# Patient Record
Sex: FEMALE | Race: WHITE
[De-identification: names, ages, dates, MRNs, and addresses within clinical notes are randomized per-mention and may not be internally consistent; named-entity substitution may affect disease eponyms.]

---

## 2017-05-12 ENCOUNTER — HOSPITAL ENCOUNTER (EMERGENCY)
Dept: HOSPITAL 79 - ER1 | Age: 67
Discharge: HOME | End: 2017-05-12
Payer: MEDICARE

## 2017-05-12 DIAGNOSIS — Z79.899: ICD-10-CM

## 2017-05-12 DIAGNOSIS — M06.9: ICD-10-CM

## 2017-05-12 DIAGNOSIS — M19.022: Primary | ICD-10-CM

## 2017-05-12 DIAGNOSIS — Z88.0: ICD-10-CM

## 2017-05-12 DIAGNOSIS — Z87.891: ICD-10-CM

## 2017-06-05 ENCOUNTER — HOSPITAL ENCOUNTER (EMERGENCY)
Dept: HOSPITAL 79 - ER1 | Age: 67
Discharge: HOME | End: 2017-06-05
Payer: MEDICARE

## 2017-06-05 DIAGNOSIS — Z79.82: ICD-10-CM

## 2017-06-05 DIAGNOSIS — M71.22: Primary | ICD-10-CM

## 2017-06-05 DIAGNOSIS — M71.21: ICD-10-CM

## 2017-06-05 DIAGNOSIS — Z79.899: ICD-10-CM

## 2017-06-05 LAB
BUN/CREATININE RATIO: 16 (ref 0–10)
HGB BLD-MCNC: 14.2 GM/DL (ref 12.3–15.3)
RED BLOOD COUNT: 4.79 M/UL (ref 4–5.1)
WHITE BLOOD COUNT: 10.1 K/UL (ref 4.5–11)

## 2018-10-25 ENCOUNTER — TELEPHONE (OUTPATIENT)
Dept: ORTHOPEDIC SURGERY | Facility: CLINIC | Age: 68
End: 2018-10-25

## 2018-10-25 NOTE — TELEPHONE ENCOUNTER
Isaias,    Ask Radha or Yolanda if this patient can be worked in on Wednesday 10-31 or Monday 11-5.    Jarod

## 2018-10-25 NOTE — TELEPHONE ENCOUNTER
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 They states that Dr Saez did her knee replacement years ago and is wanting to see when she can come in ASAP to be seen. The physician does not think her hip will hold by the end of November which would be the next available that we would have for a New Patient per [employer] policy if it has been over three years. They are requesting to see if Dr Saez would be able to work in sooner based on the severity Dr Savage stating is an urgent matter.

## 2018-10-30 DIAGNOSIS — M25.551 ARTHRALGIA OF RIGHT HIP: Primary | ICD-10-CM

## 2018-10-31 ENCOUNTER — HOSPITAL ENCOUNTER (OUTPATIENT)
Dept: CT IMAGING | Facility: HOSPITAL | Age: 68
Discharge: HOME | End: 2018-10-31

## 2018-10-31 ENCOUNTER — LAB (OUTPATIENT)
Dept: LAB | Facility: HOSPITAL | Age: 68
End: 2018-10-31
Attending: ORTHOPAEDIC SURGERY

## 2018-10-31 ENCOUNTER — HOSPITAL ENCOUNTER (OUTPATIENT)
Dept: CT IMAGING | Facility: HOSPITAL | Age: 68
Discharge: HOME | End: 2018-10-31
Attending: ORTHOPAEDIC SURGERY | Admitting: ORTHOPAEDIC SURGERY

## 2018-10-31 ENCOUNTER — OFFICE VISIT (OUTPATIENT)
Dept: ORTHOPEDIC SURGERY | Facility: CLINIC | Age: 68
End: 2018-10-31

## 2018-10-31 ENCOUNTER — PREP FOR SURGERY (OUTPATIENT)
Dept: OTHER | Facility: HOSPITAL | Age: 68
End: 2018-10-31

## 2018-10-31 VITALS — WEIGHT: 146 LBS | BODY MASS INDEX: 27.56 KG/M2 | HEIGHT: 61 IN | RESPIRATION RATE: 18 BRPM

## 2018-10-31 DIAGNOSIS — M87.9 OSTEONECROSIS OF RIGHT HIP (HCC): Primary | ICD-10-CM

## 2018-10-31 DIAGNOSIS — R93.89 ABNORMAL CHEST X-RAY: ICD-10-CM

## 2018-10-31 DIAGNOSIS — M25.551 ARTHRALGIA OF RIGHT HIP: ICD-10-CM

## 2018-10-31 LAB
BUN BLD-MCNC: 15 MG/DL (ref 7–20)
CREAT BLD-MCNC: 0.5 MG/DL (ref 0.6–1.3)
GFR SERPL CREATININE-BSD FRML MDRD: 123 ML/MIN/1.73

## 2018-10-31 PROCEDURE — 25010000002 IOPAMIDOL 61 % SOLUTION: Performed by: ORTHOPAEDIC SURGERY

## 2018-10-31 PROCEDURE — 84520 ASSAY OF UREA NITROGEN: CPT

## 2018-10-31 PROCEDURE — 36415 COLL VENOUS BLD VENIPUNCTURE: CPT

## 2018-10-31 PROCEDURE — 82565 ASSAY OF CREATININE: CPT

## 2018-10-31 PROCEDURE — 99214 OFFICE O/P EST MOD 30 MIN: CPT | Performed by: ORTHOPAEDIC SURGERY

## 2018-10-31 PROCEDURE — 71260 CT THORAX DX C+: CPT

## 2018-10-31 RX ORDER — TRAMADOL HYDROCHLORIDE 50 MG/1
50 TABLET ORAL 2 TIMES DAILY PRN
Qty: 30 TABLET | Refills: 0 | Status: CANCELLED | OUTPATIENT
Start: 2018-10-31

## 2018-10-31 RX ORDER — ACETAMINOPHEN 500 MG
500 TABLET ORAL 2 TIMES DAILY
COMMUNITY

## 2018-10-31 RX ORDER — NAPROXEN 500 MG/1
500 TABLET ORAL 2 TIMES DAILY WITH MEALS
COMMUNITY

## 2018-10-31 RX ORDER — CLINDAMYCIN PHOSPHATE 900 MG/50ML
900 INJECTION, SOLUTION INTRAVENOUS
Status: CANCELLED | OUTPATIENT
Start: 2018-12-04 | End: 2018-12-05

## 2018-10-31 RX ADMIN — IOPAMIDOL 100 ML: 612 INJECTION, SOLUTION INTRAVENOUS at 13:56

## 2018-11-01 ENCOUNTER — TELEPHONE (OUTPATIENT)
Dept: ORTHOPEDIC SURGERY | Facility: CLINIC | Age: 68
End: 2018-11-01

## 2018-11-01 RX ORDER — TRAMADOL HYDROCHLORIDE 50 MG/1
50 TABLET ORAL 2 TIMES DAILY PRN
Qty: 30 TABLET | Refills: 0 | Status: SHIPPED | OUTPATIENT
Start: 2018-11-01

## 2018-11-01 NOTE — TELEPHONE ENCOUNTER
Franklyn Shipman PA-C reviewed results of patients chest CT from 10/31/18 ordered by Dr. Saez. STAT referral made to Dr. Devine. PCP Notified by Jagdeep Spangler and faxed results. Dr. Gunn office will contact pt for appt.

## 2018-11-01 NOTE — PROGRESS NOTES
Allegra and Jagdeep,     Tramadol Rx signed.    Patient referred to Dr. Devine appreciated, please follow up today and find out the date of appointment with Pulmonary and let me know.  Also we can see her back at our clinic once we know what will be involved with her Pulmonary care plan. Thanks.    Jarod

## 2018-11-05 ENCOUNTER — APPOINTMENT (OUTPATIENT)
Dept: PREADMISSION TESTING | Facility: HOSPITAL | Age: 68
End: 2018-11-05

## 2018-11-05 ENCOUNTER — OFFICE VISIT (OUTPATIENT)
Dept: PULMONOLOGY | Facility: CLINIC | Age: 68
End: 2018-11-05

## 2018-11-05 ENCOUNTER — PREP FOR SURGERY (OUTPATIENT)
Dept: OTHER | Facility: HOSPITAL | Age: 68
End: 2018-11-05

## 2018-11-05 VITALS
BODY MASS INDEX: 27.56 KG/M2 | DIASTOLIC BLOOD PRESSURE: 68 MMHG | OXYGEN SATURATION: 92 % | HEART RATE: 108 BPM | SYSTOLIC BLOOD PRESSURE: 110 MMHG | WEIGHT: 146 LBS | HEIGHT: 61 IN | RESPIRATION RATE: 18 BRPM

## 2018-11-05 VITALS
HEART RATE: 107 BPM | SYSTOLIC BLOOD PRESSURE: 177 MMHG | BODY MASS INDEX: 27.56 KG/M2 | WEIGHT: 146 LBS | OXYGEN SATURATION: 95 % | HEIGHT: 61 IN | DIASTOLIC BLOOD PRESSURE: 79 MMHG

## 2018-11-05 DIAGNOSIS — R59.0 MEDIASTINAL LYMPHADENOPATHY: ICD-10-CM

## 2018-11-05 DIAGNOSIS — Z87.891 PERSONAL HISTORY OF TOBACCO USE, PRESENTING HAZARDS TO HEALTH: ICD-10-CM

## 2018-11-05 DIAGNOSIS — R91.1 NODULE OF UPPER LOBE OF LEFT LUNG: ICD-10-CM

## 2018-11-05 DIAGNOSIS — J30.89 OTHER ALLERGIC RHINITIS: ICD-10-CM

## 2018-11-05 DIAGNOSIS — R59.0 MEDIASTINAL LYMPHADENOPATHY: Primary | ICD-10-CM

## 2018-11-05 DIAGNOSIS — J43.9 PULMONARY EMPHYSEMA, UNSPECIFIED EMPHYSEMA TYPE (HCC): ICD-10-CM

## 2018-11-05 DIAGNOSIS — R91.8 ABNORMAL CT SCAN, LUNG: Primary | ICD-10-CM

## 2018-11-05 DIAGNOSIS — J41.0 CHRONIC BRONCHITIS, SIMPLE (HCC): ICD-10-CM

## 2018-11-05 LAB
BASOPHILS # BLD AUTO: 0.03 10*3/MM3 (ref 0–0.2)
BASOPHILS NFR BLD AUTO: 0.4 % (ref 0–2.5)
DEPRECATED RDW RBC AUTO: 47.4 FL (ref 37–54)
EOSINOPHIL # BLD AUTO: 0.16 10*3/MM3 (ref 0–0.7)
EOSINOPHIL NFR BLD AUTO: 2.2 % (ref 0–7)
ERYTHROCYTE [DISTWIDTH] IN BLOOD BY AUTOMATED COUNT: 15.4 % (ref 11.5–14.5)
HCT VFR BLD AUTO: 38 % (ref 37–47)
HGB BLD-MCNC: 11.8 G/DL (ref 12–16)
IMM GRANULOCYTES # BLD: 0.02 10*3/MM3 (ref 0–0.06)
IMM GRANULOCYTES NFR BLD: 0.3 % (ref 0–0.6)
LYMPHOCYTES # BLD AUTO: 1.33 10*3/MM3 (ref 0.6–3.4)
LYMPHOCYTES NFR BLD AUTO: 18.6 % (ref 10–50)
MCH RBC QN AUTO: 26.5 PG (ref 27–31)
MCHC RBC AUTO-ENTMCNC: 31.1 G/DL (ref 30–37)
MCV RBC AUTO: 85.2 FL (ref 81–99)
MONOCYTES # BLD AUTO: 0.48 10*3/MM3 (ref 0–0.9)
MONOCYTES NFR BLD AUTO: 6.7 % (ref 0–12)
NEUTROPHILS # BLD AUTO: 5.14 10*3/MM3 (ref 2–6.9)
NEUTROPHILS NFR BLD AUTO: 71.8 % (ref 37–80)
NRBC BLD MANUAL-RTO: 0 /100 WBC (ref 0–0)
PLATELET # BLD AUTO: 474 10*3/MM3 (ref 130–400)
PMV BLD AUTO: 10 FL (ref 6–12)
RBC # BLD AUTO: 4.46 10*6/MM3 (ref 4.2–5.4)
WBC NRBC COR # BLD: 7.16 10*3/MM3 (ref 4.8–10.8)

## 2018-11-05 PROCEDURE — 93005 ELECTROCARDIOGRAM TRACING: CPT | Performed by: INTERNAL MEDICINE

## 2018-11-05 PROCEDURE — 85025 COMPLETE CBC W/AUTO DIFF WBC: CPT | Performed by: INTERNAL MEDICINE

## 2018-11-05 PROCEDURE — 99204 OFFICE O/P NEW MOD 45 MIN: CPT | Performed by: INTERNAL MEDICINE

## 2018-11-05 PROCEDURE — 36415 COLL VENOUS BLD VENIPUNCTURE: CPT

## 2018-11-05 RX ORDER — SODIUM CHLORIDE 9 MG/ML
42 INJECTION, SOLUTION INTRAVENOUS CONTINUOUS
Status: CANCELLED | OUTPATIENT
Start: 2018-11-05

## 2018-11-05 RX ORDER — FLUNISOLIDE 0.25 MG/ML
1 SOLUTION NASAL EVERY 12 HOURS
Qty: 1 BOTTLE | Refills: 5 | Status: SHIPPED | OUTPATIENT
Start: 2018-11-05

## 2018-11-05 RX ORDER — BUDESONIDE AND FORMOTEROL FUMARATE DIHYDRATE 160; 4.5 UG/1; UG/1
2 AEROSOL RESPIRATORY (INHALATION)
Qty: 1 INHALER | Refills: 5 | Status: SHIPPED | OUTPATIENT
Start: 2018-11-05

## 2018-11-05 NOTE — DISCHARGE INSTRUCTIONS

## 2018-11-05 NOTE — PROGRESS NOTES
Chief Complaint   Patient presents with   • Follow-up     Abnormal CT          Subjective   Annie Harrell is a 68 y.o. female.     History of Present Illness   Patient comes in today for consultation because of abnormal CT of the chest.    The patient apparently went to her rheumatologist and complained of some cough.  Since the dermatologist was considering starting the patient on biologic agents for rheumatoid arthritis, he ordered a chest x-ray which was reportedly abnormal.  Around the same time she was evaluated by Dr. Saez for possible hip surgery and he ordered a CT of the chest which was reported as abnormal involving left upper lobe lung nodule and lymphadenopathy.    The patient states that she used to smoke one to 2 packs per day for about 15 years having quit 20 years ago.  She also has a positive family history of lung cancer in her sister.    The patient apparently has lost at least 40 pounds over the past 2-1/2 years.     The patient also complains of cough and phlegm production the morning.  She does not complaining of shortness of breath but her x-rays capacity is extremely limited.    Patient also complains of runny nose and dribbling in the back of the throat for the past few weeks. This has been sometimes associated with seasonal variation.     The following portions of the patient's history were reviewed and updated as appropriate: allergies, current medications, past family history, past medical history, past social history and past surgical history.    Review of Systems   Constitutional: Negative for chills and fever.   HENT: Negative for sinus pressure, sneezing and sore throat.    Respiratory: Positive for cough. Negative for chest tightness and shortness of breath.    Cardiovascular: Negative for chest pain.   Psychiatric/Behavioral: Negative for sleep disturbance.   All other systems reviewed and are negative.      Objective   Visit Vitals  /68   Pulse 108   Resp 18   Ht 154.9 cm  "(60.98\")   Wt 66.2 kg (146 lb)   SpO2 92%   BMI 27.60 kg/m²       Physical Exam   Constitutional: She is oriented to person, place, and time. She appears well-developed.   HENT:   Nostril showed mild erythema.  Oropharynx was cobblestoned   Dentures   Eyes: EOM are normal.   Neck: Neck supple. No JVD present.   Cardiovascular: Normal rate and regular rhythm.    Pulmonary/Chest: Effort normal. She has no rales.   Somewhat hyperresonant to percussion.  Decreased Air Entry Bilaterally.   Musculoskeletal:   Used a walker.   Changes of RA noted.    Neurological: She is alert and oriented to person, place, and time.   Skin: Skin is warm and dry.   Psychiatric: She has a normal mood and affect. Her behavior is normal.   Vitals reviewed.        Assessment/Plan   Annie was seen today for follow-up.    Diagnoses and all orders for this visit:    Abnormal CT scan, lung    Chronic bronchitis, simple (CMS/HCC)    Mediastinal lymphadenopathy    Pulmonary emphysema, unspecified emphysema type (CMS/HCC)    Personal history of tobacco use, presenting hazards to health    Other allergic rhinitis    Other orders  -     budesonide-formoterol (SYMBICORT) 160-4.5 MCG/ACT inhaler; Inhale 2 puffs 2 (Two) Times a Day. Rinse mouth with water after use.  -     flunisolide (NASALIDE) 25 MCG/ACT (0.025%) solution nasal spray; Inhale 1 spray Every 12 (Twelve) Hours.           Return in about 9 days (around 11/14/2018) for Recheck, Overbook.    DISCUSSION (if any):  I have reviewed the CT images personally and also shared them with the patient and her daughter.  The images are extremely concerning for malignant process involving the left upper lobe with possible metastatic lymph nodes involving the mediastinum.    The patient has right-sided and subcarinal lymph nodes.    The risks of Bronchoscopy with EBUS including but not limited to damage to the overlying structures, pneumothorax, bleeding, worsening of respiratory failure, requirement for " chest tube placement among others were explained.    The alternatives were also discussed with the patient & patient's daughter.    The patient has considered the above-mentioned risks, benefits and alternatives and has agreed to proceed with the procedure.    As far as her symptoms are concerned, and she does seem to have chronic bronchitis and I started her on Symbicort.    Patient will be started on nasal spray for symptoms which are definitely consistent with allergic rhinitis.     If symptoms do not improve, then we will consider ordering IgE/RAST panel.        Dictated utilizing Dragon dictation.    This document was electronically signed by Rachael Devine MD November 5, 2018  2:58 PM

## 2018-11-08 ENCOUNTER — ANESTHESIA EVENT (OUTPATIENT)
Dept: PERIOP | Facility: HOSPITAL | Age: 68
End: 2018-11-08

## 2018-11-08 ENCOUNTER — HOSPITAL ENCOUNTER (OUTPATIENT)
Facility: HOSPITAL | Age: 68
Setting detail: HOSPITAL OUTPATIENT SURGERY
Discharge: HOME | End: 2018-11-08
Attending: INTERNAL MEDICINE | Admitting: INTERNAL MEDICINE

## 2018-11-08 ENCOUNTER — ANESTHESIA (OUTPATIENT)
Dept: PERIOP | Facility: HOSPITAL | Age: 68
End: 2018-11-08

## 2018-11-08 ENCOUNTER — APPOINTMENT (OUTPATIENT)
Dept: GENERAL RADIOLOGY | Facility: HOSPITAL | Age: 68
End: 2018-11-08
Attending: INTERNAL MEDICINE

## 2018-11-08 VITALS
HEART RATE: 111 BPM | SYSTOLIC BLOOD PRESSURE: 118 MMHG | OXYGEN SATURATION: 91 % | RESPIRATION RATE: 20 BRPM | DIASTOLIC BLOOD PRESSURE: 70 MMHG | TEMPERATURE: 98.8 F

## 2018-11-08 DIAGNOSIS — R59.0 MEDIASTINAL LYMPHADENOPATHY: ICD-10-CM

## 2018-11-08 DIAGNOSIS — R91.1 NODULE OF UPPER LOBE OF LEFT LUNG: ICD-10-CM

## 2018-11-08 PROCEDURE — 25010000002 FENTANYL CITRATE (PF) 100 MCG/2ML SOLUTION: Performed by: NURSE ANESTHETIST, CERTIFIED REGISTERED

## 2018-11-08 PROCEDURE — 31628 BRONCHOSCOPY/LUNG BX EACH: CPT | Performed by: INTERNAL MEDICINE

## 2018-11-08 PROCEDURE — 87205 SMEAR GRAM STAIN: CPT | Performed by: INTERNAL MEDICINE

## 2018-11-08 PROCEDURE — 31652 BRONCH EBUS SAMPLNG 1/2 NODE: CPT | Performed by: INTERNAL MEDICINE

## 2018-11-08 PROCEDURE — 25010000002 DEXAMETHASONE PER 1 MG: Performed by: NURSE ANESTHETIST, CERTIFIED REGISTERED

## 2018-11-08 PROCEDURE — 87186 SC STD MICRODIL/AGAR DIL: CPT | Performed by: INTERNAL MEDICINE

## 2018-11-08 PROCEDURE — 25010000002 ONDANSETRON PER 1 MG: Performed by: NURSE ANESTHETIST, CERTIFIED REGISTERED

## 2018-11-08 PROCEDURE — 25010000002 PROPOFOL 200 MG/20ML EMULSION: Performed by: NURSE ANESTHETIST, CERTIFIED REGISTERED

## 2018-11-08 PROCEDURE — 71045 X-RAY EXAM CHEST 1 VIEW: CPT

## 2018-11-08 PROCEDURE — 87206 SMEAR FLUORESCENT/ACID STAI: CPT | Performed by: INTERNAL MEDICINE

## 2018-11-08 PROCEDURE — 31623 DX BRONCHOSCOPE/BRUSH: CPT | Performed by: INTERNAL MEDICINE

## 2018-11-08 PROCEDURE — 87070 CULTURE OTHR SPECIMN AEROBIC: CPT | Performed by: INTERNAL MEDICINE

## 2018-11-08 PROCEDURE — 31624 DX BRONCHOSCOPE/LAVAGE: CPT | Performed by: INTERNAL MEDICINE

## 2018-11-08 PROCEDURE — 31625 BRONCHOSCOPY W/BIOPSY(S): CPT | Performed by: INTERNAL MEDICINE

## 2018-11-08 PROCEDURE — 25010000002 PROPOFOL 1000 MG/ML EMULSION: Performed by: NURSE ANESTHETIST, CERTIFIED REGISTERED

## 2018-11-08 PROCEDURE — 87077 CULTURE AEROBIC IDENTIFY: CPT | Performed by: INTERNAL MEDICINE

## 2018-11-08 PROCEDURE — 87102 FUNGUS ISOLATION CULTURE: CPT | Performed by: INTERNAL MEDICINE

## 2018-11-08 PROCEDURE — 87116 MYCOBACTERIA CULTURE: CPT | Performed by: INTERNAL MEDICINE

## 2018-11-08 PROCEDURE — C1726 CATH, BAL DIL, NON-VASCULAR: HCPCS | Performed by: INTERNAL MEDICINE

## 2018-11-08 RX ORDER — LORAZEPAM 2 MG/ML
0.25 INJECTION INTRAMUSCULAR ONCE
Status: DISCONTINUED | OUTPATIENT
Start: 2018-11-08 | End: 2018-11-08 | Stop reason: HOSPADM

## 2018-11-08 RX ORDER — FENTANYL CITRATE 50 UG/ML
INJECTION, SOLUTION INTRAMUSCULAR; INTRAVENOUS AS NEEDED
Status: DISCONTINUED | OUTPATIENT
Start: 2018-11-08 | End: 2018-11-08 | Stop reason: SURG

## 2018-11-08 RX ORDER — ONDANSETRON 2 MG/ML
INJECTION INTRAMUSCULAR; INTRAVENOUS AS NEEDED
Status: DISCONTINUED | OUTPATIENT
Start: 2018-11-08 | End: 2018-11-08 | Stop reason: SURG

## 2018-11-08 RX ORDER — IPRATROPIUM BROMIDE AND ALBUTEROL SULFATE 2.5; .5 MG/3ML; MG/3ML
3 SOLUTION RESPIRATORY (INHALATION) ONCE AS NEEDED
Status: DISCONTINUED | OUTPATIENT
Start: 2018-11-08 | End: 2018-11-08 | Stop reason: HOSPADM

## 2018-11-08 RX ORDER — DEXAMETHASONE SODIUM PHOSPHATE 4 MG/ML
INJECTION, SOLUTION INTRA-ARTICULAR; INTRALESIONAL; INTRAMUSCULAR; INTRAVENOUS; SOFT TISSUE AS NEEDED
Status: DISCONTINUED | OUTPATIENT
Start: 2018-11-08 | End: 2018-11-08 | Stop reason: SURG

## 2018-11-08 RX ORDER — PROPOFOL 10 MG/ML
INJECTION, EMULSION INTRAVENOUS AS NEEDED
Status: DISCONTINUED | OUTPATIENT
Start: 2018-11-08 | End: 2018-11-08 | Stop reason: SURG

## 2018-11-08 RX ORDER — ROCURONIUM BROMIDE 10 MG/ML
INJECTION, SOLUTION INTRAVENOUS AS NEEDED
Status: DISCONTINUED | OUTPATIENT
Start: 2018-11-08 | End: 2018-11-08 | Stop reason: SURG

## 2018-11-08 RX ORDER — SODIUM CHLORIDE 9 MG/ML
42 INJECTION, SOLUTION INTRAVENOUS CONTINUOUS
Status: DISCONTINUED | OUTPATIENT
Start: 2018-11-08 | End: 2018-11-08 | Stop reason: HOSPADM

## 2018-11-08 RX ORDER — ONDANSETRON 2 MG/ML
4 INJECTION INTRAMUSCULAR; INTRAVENOUS ONCE AS NEEDED
Status: DISCONTINUED | OUTPATIENT
Start: 2018-11-08 | End: 2018-11-08 | Stop reason: HOSPADM

## 2018-11-08 RX ADMIN — DEXAMETHASONE SODIUM PHOSPHATE 8 MG: 4 INJECTION, SOLUTION INTRAMUSCULAR; INTRAVENOUS at 07:50

## 2018-11-08 RX ADMIN — ONDANSETRON 4 MG: 2 INJECTION INTRAMUSCULAR; INTRAVENOUS at 07:50

## 2018-11-08 RX ADMIN — ROCURONIUM BROMIDE 10 MG: 10 INJECTION INTRAVENOUS at 08:15

## 2018-11-08 RX ADMIN — PROPOFOL 120 MCG/KG/MIN: 10 INJECTION, EMULSION INTRAVENOUS at 07:50

## 2018-11-08 RX ADMIN — SUGAMMADEX 500 MG: 100 INJECTION, SOLUTION INTRAVENOUS at 08:41

## 2018-11-08 RX ADMIN — PROPOFOL 90 MG: 10 INJECTION, EMULSION INTRAVENOUS at 07:50

## 2018-11-08 RX ADMIN — SODIUM CHLORIDE 42 ML/HR: 9 INJECTION, SOLUTION INTRAVENOUS at 06:36

## 2018-11-08 RX ADMIN — SODIUM CHLORIDE: 9 INJECTION, SOLUTION INTRAVENOUS at 06:49

## 2018-11-08 RX ADMIN — FENTANYL CITRATE 100 MCG: 50 INJECTION, SOLUTION INTRAMUSCULAR; INTRAVENOUS at 07:50

## 2018-11-08 RX ADMIN — ROCURONIUM BROMIDE 10 MG: 10 INJECTION INTRAVENOUS at 08:30

## 2018-11-08 RX ADMIN — ROCURONIUM BROMIDE 30 MG: 10 INJECTION INTRAVENOUS at 07:51

## 2018-11-08 NOTE — DISCHARGE INSTRUCTIONS
Please follow all post op instructions and follow up appointment time from your physician's office included in your discharge packet.    Rest today  No pushing,pulling,tugging,heavy lifting, or strenuous activity   No major decision making,driving,or drinking alcoholic beverages for 24 hours due to the sedation you received  Always use good hand hygiene/washing technique  No driving on pain medication.    To assist you in voiding:  Drink plenty of fluids  Listen to running water while attempting to void.    If you are unable to urinate and you have an uncomfortable urge to void or it has been   6 hours since you were discharged, return to the Emergency Room.      BRONCHOSCOPY AFTER CARE:  WHAT TO EXPECT AFTER THE PROCEDURE    It is normal to have these symptoms 24-48 hours following your procedure.  * increased cough  * low grade fever  * sore throat or hoarse voice  * small streaks of blood in your thick spit(sputum)    HOME CARE INSTRUCTIONS    * Do not eat or drink anything for two hours (or as instructed by your physician) after your procedure.Then for the first 4 hours cool liquids as tolerated. If you try to eat or drink before the medicine wears off, food or drink could go into your lungs or you could burn your self.  After the numbness is gone and your cough and gag reflexes have returned, you may eat soft food and drink room temperature liquids slowly.  * The day after the procedure, you can go back to your normal diet.  * You may resume normal activities.  * You make take tylenol 650 mg for low grade temperature.  * Keep all follow up visits as directed by your health care provider.    SEED IMMEDIATE MEDICAL CARE IF:    * You experience increasing shortness of breath.  * You become light-headed or faint.  * You have chest pain.  * You cough up more than a small amount of blood( a cup in 6 hours)  * The amount of blood you cough up increases.    MAKE SURE YOU:    * Understand these instructions.  * Will  watch your condition.  * Will get help right away if you are not doing well (if unable to contact your provider, return to the ED)  * No driving , no alcohol, and no major decisions for 24 hours.  * Avoid cigarettes for at least 24 hours.  Plan to stop smoking!

## 2018-11-08 NOTE — ANESTHESIA PROCEDURE NOTES
Airway  Urgency: elective    Airway not difficult    General Information and Staff    Patient location during procedure: OR  CRNA: CLAUDIA TY    Indications and Patient Condition  Indications for airway management: airway protection    Preoxygenated: yes  MILS maintained throughout  Mask difficulty assessment: 1 - vent by mask    Final Airway Details  Final airway type: endotracheal airway      Successful airway: ETT  Cuffed: yes   Successful intubation technique: direct laryngoscopy  Facilitating devices/methods: intubating stylet  Endotracheal tube insertion site: oral  Blade: Shanna  Blade size: 3  ETT size: 9.0 (size per request of surgeon) mm  Cormack-Lehane Classification: grade I - full view of glottis  Placement verified by: chest auscultation and capnometry   Cuff volume (mL): 6  Measured from: teeth  ETT to teeth (cm): 21  Number of attempts at approach: 1    Additional Comments  Airway placed without problems. Dentition and lips as noted on pre-induction. ETT cuff inflated to minimal occlusive pressure. ETT secured.

## 2018-11-08 NOTE — OP NOTE
Date of Procedure: November 8, 2018      Type:   1. Bronchoscopy with bronchoalveolar lavage  2. Bronchoscopy with [place] Biopsy   3. Bronchoscopy with trans-bronchial biopsies under Flouro Guidance  4. Endobronchial ultrasound-guided needle biopsy of lymph node station (4R)     Reason for procedure: Pre-Op Diagnosis Codes:     * Mediastinal lymphadenopathy [R59.0]     * Nodule of upper lobe of left lung [R91.1]      Consent: Consent was obtained from the patient after explanation of the risks and benefits of the procedure. Risks including but not limited to damage to the overlying structures, pneumothorax, bleeding, infection, worsening of respiratory status, requirement for chest tube placement among others were explained.    Patient and family understood the risks and benefits and agreed to proceed with the procedure    Time Out: Time out was done with the RN and Bronch Technician at the bedside after confirmation of the site of the procedure and name and date of birth with the patient's ID band    Details of the procedure:   General anesthesia was provided by the anesthesia team. she was intubated with appropriate size ET tube.  Vital signs were monitored by them, as well. Once under anesthesia, the bronchoscope was introduced through the endotracheal tube with visualization of the jason.     The trachea was central. The jason was sharp.     Right side was inspected first. The bronchoscope was introduced into the main stem.  No significant abnormality of the right lung noted although there were some secretions which were suctioned without any difficulty.    Next, the left side was evaluated. The bronchoscope was introduced into the main stem.  The left upper lobe was inspected first.  Under fluoroscopy guidance 2 separate brushes were performed in the left upper lobe in close proximity to the nodule which was visible on fluoroscopy.  The left mainstem then, towards the under side of the left lower lobe takeoff,   showed thickening and possible presence of a mass.  This was somewhat of a surprising finding as it was not mentioned on the CT.  Given this new finding multiple endobronchial biopsies were taken.  A transbronchial biopsy was also taken from the left lower lobe, likely superior subsegment.    After endobronchial and transbronchial biopsies have been obtained from the left mainstem/lower lobe, one more brush biopsy was obtained from the left lower lobe under fluoroscopy guidance as well.    Total of 3 separate brush biopsies were obtained under direct endoscopic and flouroscopic guidance.     During the entire time bronchial alveolar lavage was collected as well, before and after the biopsies were obtained. A total of 140 ml was instilled and return of approximately 50 ml was obtained lavage was collected from the same subsegment.    After this, the procedure was converted over to endobronchial ultrasound scope.  The endobronchial ultrasound scope was introduced through the endotracheal tube and a general inspection was carried out with ultrasound to identify any significant lesions.      A structure was identified in the station 4R region.  Vascular structures were identified on doppler and once satifactory assessment was made that the structure did appear to be consistent with lymph node, and after a safe window was identified and under live ultrasound guidance a needle biopsy was obtained and on site pathologist evaluated the needle aspirate. Malignant tissue was identified by our local pathologist.     A total of 2 on-site evaluations from 1 lymph node was performed.  The needle aspirations seemed to be from station 4R.     After another general inspection with a ultrasound, and visual inspection of the trachea with the bronchoscope, the procedure was terminated.    The patient overall did not have any significant complications.     The patient will be monitored by anesthesia. The patient will be discharged home  once deemed stable by anesthesia team and if the Chest X Ray results do not reveal any complications.     Complications:  No significant immediate complications noted, apart from mentioned above.    Chest X Ray has been ordered.    Post Op Impression:  1.  Left upper lobe nodule  2.  Left lung mass, endobronchial  3.  Metastatic lymphadenopathy, Station 4R.      This document was electronically signed by Rachael Devine MD November 8, 2018  8:44 AM

## 2018-11-08 NOTE — ANESTHESIA POSTPROCEDURE EVALUATION
Patient: Annie Harrell    Procedure Summary     Date:  11/08/18 Room / Location:  [facility] /  [facility]    Anesthesia Start:  0740 Anesthesia Stop:  0857    Procedure:  BRONCHOSCOPY WITH ENDOBRONCHIAL ULTRASOUND with General Anesthesia and Flouro. Will need to be intubated with ETT of 8.5 (or bigger) OR LMA 4.0. Pathologist is requested to be in the room please. (N/A Bronchus) Diagnosis:       Mediastinal lymphadenopathy      Nodule of upper lobe of left lung      (Mediastinal lymphadenopathy [R59.0])      (Nodule of upper lobe of left lung [R91.1])    Surgeon:  Rachael Devine MD Provider:  Trey Jimenez CRNA    Anesthesia Type:  general ASA Status:  3          Anesthesia Type: general  Last vitals  BP   103/53 @ 0858   Temp 97.9   Pulse 106   Resp 15   SpO2 99%     Post Anesthesia Care and Evaluation    Patient location during evaluation: PACU  Patient participation: complete - patient participated  Level of consciousness: awake and alert  Pain score: 2  Pain management: adequate  Airway patency: patent  Anesthetic complications: No anesthetic complications  PONV Status: none  Cardiovascular status: acceptable  Respiratory status: acceptable and face mask  Hydration status: acceptable

## 2018-11-11 LAB
BACTERIA SPEC RESP CULT: ABNORMAL
GRAM STN SPEC: ABNORMAL
GRAM STN SPEC: ABNORMAL

## 2018-11-12 ENCOUNTER — TELEPHONE (OUTPATIENT)
Dept: PULMONOLOGY | Facility: CLINIC | Age: 68
End: 2018-11-12

## 2018-11-12 NOTE — TELEPHONE ENCOUNTER
Called to remind the patient of her appointment on 11/13/18, patients daughter confirmed the appointment.

## 2018-11-13 ENCOUNTER — OFFICE VISIT (OUTPATIENT)
Dept: PULMONOLOGY | Facility: CLINIC | Age: 68
End: 2018-11-13

## 2018-11-13 VITALS
BODY MASS INDEX: 27.56 KG/M2 | HEIGHT: 61 IN | DIASTOLIC BLOOD PRESSURE: 62 MMHG | SYSTOLIC BLOOD PRESSURE: 120 MMHG | HEART RATE: 98 BPM | WEIGHT: 146 LBS | OXYGEN SATURATION: 94 %

## 2018-11-13 DIAGNOSIS — A49.1 STREPTOCOCCUS PNEUMONIAE: ICD-10-CM

## 2018-11-13 DIAGNOSIS — C34.92 ADENOCARCINOMA OF LUNG, STAGE 4, LEFT (HCC): Primary | ICD-10-CM

## 2018-11-13 DIAGNOSIS — R91.8 ABNORMAL CT SCAN, LUNG: ICD-10-CM

## 2018-11-13 LAB
LAB AP CASE REPORT: NORMAL
PATH REPORT.FINAL DX SPEC: NORMAL

## 2018-11-13 PROCEDURE — 99214 OFFICE O/P EST MOD 30 MIN: CPT | Performed by: INTERNAL MEDICINE

## 2018-11-13 RX ORDER — LEVOFLOXACIN 500 MG/1
500 TABLET, FILM COATED ORAL DAILY
Qty: 5 TABLET | Refills: 0 | Status: SHIPPED | OUTPATIENT
Start: 2018-11-13 | End: 2018-11-18

## 2018-11-13 NOTE — PROGRESS NOTES
"Chief Complaint   Patient presents with   • Follow-up         Subjective   Annie Harrell is a 68 y.o. female.     History of Present Illness   Patient is back today to discuss the results of bronchoscopy.    The patient's daughter has noticed some increased fatigue and occasional yellowish sputum production.  The patient's daughter denies any fever or chills.    The patient does report some decrease in her energy levels.    The following portions of the patient's history were reviewed and updated as appropriate: allergies, current medications, past family history, past medical history, past social history and past surgical history.    Review of Systems   Constitutional: Negative for chills and fever.   HENT: Negative for sinus pressure, sneezing and sore throat.    Respiratory: Positive for choking. Negative for shortness of breath and wheezing.        Objective   Visit Vitals  /62 (BP Location: Left arm, Patient Position: Sitting, Cuff Size: Adult)   Pulse 98   Ht 154.9 cm (61\")   Wt 66.2 kg (146 lb)   SpO2 94%   BMI 27.59 kg/m²       Physical Exam   Constitutional: She is oriented to person, place, and time. She appears well-developed and well-nourished.   Eyes: EOM are normal.   Neck: Neck supple. No JVD present.   Cardiovascular: Normal rate.   Pulmonary/Chest: Effort normal.   Musculoskeletal:   Used a wheelchair.   Neurological: She is alert and oriented to person, place, and time.   Skin: Skin is warm and dry.   Vitals reviewed.      Assessment/Plan   Annie was seen today for follow-up.    Diagnoses and all orders for this visit:    Adenocarcinoma of lung, stage 4, left (CMS/HCC)  -     Cancel: Ambulatory Referral to Hematology / Oncology  -     Ambulatory Referral to Hematology / Oncology    Abnormal CT scan, lung    Streptococcus pneumoniae    Other orders  -     levoFLOXacin (LEVAQUIN) 500 MG tablet; Take 1 tablet by mouth Daily for 5 days.         Return if symptoms worsen or fail to " improve.    DISCUSSION (if any):  At a very long discussion with the patient and her family member and told them that unfortunately the pathology was consistent with adenocarcinoma in the lung biopsy as well as the lymph node (4R) aspirate.    I also reviewed the culture result which showed Streptococcus pneumoniae.    I will start the patient on Levaquin given some of her symptoms of somewhat increased fatigue and cough.    I spent a total of 35 minutes face to face with this patient. his pertinent current and previous data, as applicable, were reviewed. Patient's diagnostic studies were also reviewed.  At least 18-20 minutes of the time spent, was spent in counseling about patient's underlying disease process, prognosis and further options.    After the discussion with the patient she has agreed to be referred to oncology and I have referred her to oncologist in [place], since she lives in [facility].      Dictated utilizing Dragon dictation.    This document was electronically signed by Rachael Devine MD November 13, 2018  12:00 PM

## 2018-11-14 ENCOUNTER — OFFICE VISIT (OUTPATIENT)
Dept: ORTHOPEDIC SURGERY | Facility: CLINIC | Age: 68
End: 2018-11-14

## 2018-11-14 VITALS — WEIGHT: 146 LBS | HEIGHT: 61 IN | BODY MASS INDEX: 27.56 KG/M2 | RESPIRATION RATE: 18 BRPM

## 2018-11-14 DIAGNOSIS — M16.11 PRIMARY OSTEOARTHRITIS OF RIGHT HIP: ICD-10-CM

## 2018-11-14 DIAGNOSIS — M87.9 OSTEONECROSIS OF RIGHT HIP (HCC): Primary | ICD-10-CM

## 2018-11-14 DIAGNOSIS — R93.89 ABNORMAL CHEST X-RAY: ICD-10-CM

## 2018-11-14 DIAGNOSIS — M25.551 ARTHRALGIA OF RIGHT HIP: ICD-10-CM

## 2018-11-14 PROCEDURE — 99213 OFFICE O/P EST LOW 20 MIN: CPT | Performed by: ORTHOPAEDIC SURGERY

## 2018-11-14 RX ORDER — OXYCODONE HYDROCHLORIDE 5 MG/1
5 TABLET ORAL EVERY 8 HOURS PRN
Qty: 21 TABLET | Refills: 0 | Status: CANCELLED | OUTPATIENT
Start: 2018-11-14 | End: 2018-11-21
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
## 2018-11-15 RX ORDER — OXYCODONE HYDROCHLORIDE 5 MG/1
5 TABLET ORAL EVERY 8 HOURS PRN
Qty: 21 TABLET | Refills: 0 | Status: SHIPPED | OUTPATIENT
Start: 2018-11-15 | End: 2018-11-19 | Stop reason: SDUPTHER

## 2018-11-16 LAB
LAB AP CASE REPORT: NORMAL
LAB AP CLINICAL INFORMATION: NORMAL
LAB AP DIAGNOSIS COMMENT: NORMAL

## 2018-11-19 ENCOUNTER — CONSULT (OUTPATIENT)
Dept: ONCOLOGY | Facility: CLINIC | Age: 68
End: 2018-11-19

## 2018-11-19 VITALS
SYSTOLIC BLOOD PRESSURE: 119 MMHG | HEIGHT: 61 IN | WEIGHT: 146 LBS | RESPIRATION RATE: 16 BRPM | HEART RATE: 101 BPM | BODY MASS INDEX: 27.56 KG/M2 | DIASTOLIC BLOOD PRESSURE: 73 MMHG | TEMPERATURE: 98 F | OXYGEN SATURATION: 92 %

## 2018-11-19 DIAGNOSIS — M16.11 PRIMARY OSTEOARTHRITIS OF RIGHT HIP: ICD-10-CM

## 2018-11-19 DIAGNOSIS — C34.92 PRIMARY LUNG ADENOCARCINOMA, LEFT (HCC): Primary | ICD-10-CM

## 2018-11-19 DIAGNOSIS — C34.82 MALIGNANT NEOPLASM OF OVERLAPPING SITES OF LEFT LUNG (HCC): ICD-10-CM

## 2018-11-19 DIAGNOSIS — M25.551 ARTHRALGIA OF RIGHT HIP: ICD-10-CM

## 2018-11-19 DIAGNOSIS — M87.9 OSTEONECROSIS OF RIGHT HIP (HCC): ICD-10-CM

## 2018-11-19 PROCEDURE — 99205 OFFICE O/P NEW HI 60 MIN: CPT | Performed by: INTERNAL MEDICINE

## 2018-11-19 RX ORDER — ONDANSETRON 4 MG/1
4 TABLET, FILM COATED ORAL EVERY 8 HOURS PRN
Qty: 90 TABLET | Refills: 1 | Status: SHIPPED | OUTPATIENT
Start: 2018-11-19

## 2018-11-19 RX ORDER — OXYCODONE HYDROCHLORIDE 5 MG/1
5 TABLET ORAL EVERY 8 HOURS PRN
Qty: 90 TABLET | Refills: 0 | Status: SHIPPED | OUTPATIENT
Start: 2018-11-19 | End: 2018-12-03 | Stop reason: ALTCHOICE
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
## 2018-11-20 LAB
LAB AP CASE REPORT: NORMAL
LAB AP CLINICAL INFORMATION: NORMAL
PATH REPORT.FINAL DX SPEC: NORMAL

## 2018-11-27 ENCOUNTER — INFUSION (OUTPATIENT)
Dept: ONCOLOGY | Facility: HOSPITAL | Age: 68
End: 2018-11-27

## 2018-11-27 ENCOUNTER — LAB (OUTPATIENT)
Dept: ONCOLOGY | Facility: CLINIC | Age: 68
End: 2018-11-27

## 2018-11-27 ENCOUNTER — OFFICE VISIT (OUTPATIENT)
Dept: ONCOLOGY | Facility: CLINIC | Age: 68
End: 2018-11-27

## 2018-11-27 ENCOUNTER — DOCUMENTATION (OUTPATIENT)
Dept: ONCOLOGY | Facility: HOSPITAL | Age: 68
End: 2018-11-27

## 2018-11-27 ENCOUNTER — TELEPHONE (OUTPATIENT)
Dept: ONCOLOGY | Facility: HOSPITAL | Age: 68
End: 2018-11-27

## 2018-11-27 VITALS
RESPIRATION RATE: 20 BRPM | SYSTOLIC BLOOD PRESSURE: 120 MMHG | OXYGEN SATURATION: 88 % | BODY MASS INDEX: 25.64 KG/M2 | WEIGHT: 135.7 LBS | HEART RATE: 111 BPM | TEMPERATURE: 98.1 F | DIASTOLIC BLOOD PRESSURE: 71 MMHG

## 2018-11-27 VITALS
TEMPERATURE: 98.1 F | DIASTOLIC BLOOD PRESSURE: 71 MMHG | SYSTOLIC BLOOD PRESSURE: 120 MMHG | RESPIRATION RATE: 20 BRPM | BODY MASS INDEX: 25.64 KG/M2 | WEIGHT: 135.7 LBS | OXYGEN SATURATION: 88 % | HEART RATE: 111 BPM

## 2018-11-27 DIAGNOSIS — E86.0 DEHYDRATION: ICD-10-CM

## 2018-11-27 DIAGNOSIS — C34.82 MALIGNANT NEOPLASM OF OVERLAPPING SITES OF LEFT LUNG (HCC): Primary | ICD-10-CM

## 2018-11-27 DIAGNOSIS — R06.00 DYSPNEA, UNSPECIFIED TYPE: ICD-10-CM

## 2018-11-27 DIAGNOSIS — K59.00 CONSTIPATION, UNSPECIFIED CONSTIPATION TYPE: ICD-10-CM

## 2018-11-27 DIAGNOSIS — R11.2 NAUSEA AND VOMITING, INTRACTABILITY OF VOMITING NOT SPECIFIED, UNSPECIFIED VOMITING TYPE: ICD-10-CM

## 2018-11-27 DIAGNOSIS — E86.0 DEHYDRATION: Primary | ICD-10-CM

## 2018-11-27 DIAGNOSIS — R09.02 HYPOXIA: ICD-10-CM

## 2018-11-27 DIAGNOSIS — R59.0 MEDIASTINAL LYMPHADENOPATHY: ICD-10-CM

## 2018-11-27 DIAGNOSIS — C34.82 MALIGNANT NEOPLASM OF OVERLAPPING SITES OF LEFT LUNG (HCC): ICD-10-CM

## 2018-11-27 LAB
ALBUMIN SERPL-MCNC: 3.9 G/DL (ref 3.4–4.8)
ALBUMIN/GLOB SERPL: 1.1 G/DL (ref 1.5–2.5)
ALP SERPL-CCNC: 117 U/L (ref 35–104)
ALT SERPL W P-5'-P-CCNC: 6 U/L (ref 10–36)
ANION GAP SERPL CALCULATED.3IONS-SCNC: 10.1 MMOL/L (ref 3.6–11.2)
AST SERPL-CCNC: 17 U/L (ref 10–30)
BASOPHILS # BLD AUTO: 0.02 10*3/MM3 (ref 0–0.3)
BASOPHILS NFR BLD AUTO: 0.2 % (ref 0–2)
BILIRUB SERPL-MCNC: 0.5 MG/DL (ref 0.2–1.8)
BUN BLD-MCNC: 14 MG/DL (ref 7–21)
BUN/CREAT SERPL: 29.2 (ref 7–25)
CALCIUM SPEC-SCNC: 9.6 MG/DL (ref 7.7–10)
CHLORIDE SERPL-SCNC: 100 MMOL/L (ref 99–112)
CO2 SERPL-SCNC: 28.9 MMOL/L (ref 24.3–31.9)
CREAT BLD-MCNC: 0.48 MG/DL (ref 0.43–1.29)
DEPRECATED RDW RBC AUTO: 52.3 FL (ref 37–54)
EOSINOPHIL # BLD AUTO: 0.31 10*3/MM3 (ref 0–0.7)
EOSINOPHIL NFR BLD AUTO: 3.5 % (ref 0–7)
ERYTHROCYTE [DISTWIDTH] IN BLOOD BY AUTOMATED COUNT: 16.3 % (ref 11.5–14.5)
GFR SERPL CREATININE-BSD FRML MDRD: 129 ML/MIN/1.73
GLOBULIN UR ELPH-MCNC: 3.7 GM/DL
GLUCOSE BLD-MCNC: 94 MG/DL (ref 70–110)
HCT VFR BLD AUTO: 41.3 % (ref 37–47)
HGB BLD-MCNC: 12.8 G/DL (ref 12–16)
IMM GRANULOCYTES # BLD: 0.02 10*3/MM3 (ref 0–0.03)
IMM GRANULOCYTES NFR BLD: 0.2 % (ref 0–0.5)
LYMPHOCYTES # BLD AUTO: 1.26 10*3/MM3 (ref 1–3)
LYMPHOCYTES NFR BLD AUTO: 14.1 % (ref 16–46)
MCH RBC QN AUTO: 27.2 PG (ref 27–33)
MCHC RBC AUTO-ENTMCNC: 31 G/DL (ref 33–37)
MCV RBC AUTO: 87.9 FL (ref 80–94)
MONOCYTES # BLD AUTO: 0.87 10*3/MM3 (ref 0.1–0.9)
MONOCYTES NFR BLD AUTO: 9.7 % (ref 0–12)
NEUTROPHILS # BLD AUTO: 6.46 10*3/MM3 (ref 1.4–6.5)
NEUTROPHILS NFR BLD AUTO: 72.3 % (ref 40–75)
OSMOLALITY SERPL CALC.SUM OF ELEC: 277.8 MOSM/KG (ref 273–305)
PLATELET # BLD AUTO: 268 10*3/MM3 (ref 130–400)
PMV BLD AUTO: 10.6 FL (ref 6–10)
POTASSIUM BLD-SCNC: 4.2 MMOL/L (ref 3.5–5.3)
PROT SERPL-MCNC: 7.6 G/DL (ref 6–8)
RBC # BLD AUTO: 4.7 10*6/MM3 (ref 4.2–5.4)
SODIUM BLD-SCNC: 139 MMOL/L (ref 135–153)
WBC NRBC COR # BLD: 8.94 10*3/MM3 (ref 4.5–12.5)

## 2018-11-27 PROCEDURE — 96360 HYDRATION IV INFUSION INIT: CPT

## 2018-11-27 PROCEDURE — 80053 COMPREHEN METABOLIC PANEL: CPT | Performed by: NURSE PRACTITIONER

## 2018-11-27 PROCEDURE — 99214 OFFICE O/P EST MOD 30 MIN: CPT | Performed by: NURSE PRACTITIONER

## 2018-11-27 PROCEDURE — 85025 COMPLETE CBC W/AUTO DIFF WBC: CPT | Performed by: NURSE PRACTITIONER

## 2018-11-27 RX ORDER — SODIUM CHLORIDE 9 MG/ML
INJECTION, SOLUTION INTRAVENOUS
Status: COMPLETED
Start: 2018-11-27 | End: 2018-11-27

## 2018-11-27 RX ORDER — PROCHLORPERAZINE MALEATE 10 MG
10 TABLET ORAL EVERY 6 HOURS PRN
Qty: 60 TABLET | Refills: 3 | Status: SHIPPED | OUTPATIENT
Start: 2018-11-27

## 2018-11-27 RX ORDER — AMOXICILLIN 250 MG
2 CAPSULE ORAL 2 TIMES DAILY
Qty: 120 TABLET | Refills: 3 | Status: SHIPPED | OUTPATIENT
Start: 2018-11-27

## 2018-11-27 RX ADMIN — SODIUM CHLORIDE 1000 ML: 9 INJECTION, SOLUTION INTRAVENOUS at 14:00
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
## 2018-11-27 NOTE — PROGRESS NOTES
SS spoke with SAHIL Mcleod who states that patients PO2 level was 88% on room air and request for SS to arrange home oxygen.  SS spoke with patient this date.  Pt lives at home with daughter Roiso Tse.   Pt doesn't utilize any home health.  Pt states that she has a walker, wheelchair, and cane per unknown DME agency.  Pt has no preference for DME agency.  SS contacted Ethan hernandez [phone] per Chante to arrange home oxygen @ 2 Liters.  SS faxed [phone] face sheet, MD order, and dictation notes.  Pt states that she only has one daughter.  Pt's daughter provides transportation as needed.  Pt states that she is to receive a PET scan before treatment is decided.  SS will follow as needed.    SS spoke with pt and daughter about home health services.  SS explained to patient and daughter that at this time there is no skilled need that insurance will cover for pt to receive home health.  Daughter concerned about patient being alone during the day while she is at work.  SS provided additional information to pt and daughter about home health.  If skilled need arises, then pt can be arranged to receive home health and aide services.  SS will follow.

## 2018-11-27 NOTE — TELEPHONE ENCOUNTER
I have spoke to Shani. She was ok with seeing the patient today at 12 or 1230 if she could be here, or as early as she can get here. She also wanted a CBC and CMP ordered. I have called the daughter and made her aware. She verbalized understanding and is coming in. I have made our  aware to add her to Shani's schedule and to the lab schedule. Labs have been ordered.

## 2018-11-27 NOTE — TELEPHONE ENCOUNTER
----- Message from VIVIANA Nava MD sent at 11/27/2018 11:04 AM EST -----  Regarding: RE: PATIENT CALL  Contact: [phone]  SORAYA report still pending. Can she come in for an acute visit today or tomorrow to see Shani regarding her symptoms, pain, nausea, etc. Thanks.    ----- Message -----  From: Jagdeep Manzo RN  Sent: 11/27/2018  10:54 AM  To: VIVIANA Nava MD  Subject: FW: PATIENT CALL                                 Please advise. Thanks  Jagdeep  ----- Message -----  From: Dianne Ryan  Sent: 11/27/2018  10:20 AM  To: [facility] Clinical Pool  Subject: PATIENT CALL                                     The patient's daughter called stating that she was quite worried about her mother.  She hasn't been eating.  She is having quite a bit of nausea and vomiting.  She is constipated.  She is having severe pain in her back, which is causing her to call out from the pain.  She is at a loss for what to do and asked if someone could give her a call with some suggestions.

## 2018-11-28 ENCOUNTER — TELEPHONE (OUTPATIENT)
Dept: ORTHOPEDIC SURGERY | Facility: CLINIC | Age: 68
End: 2018-11-28

## 2018-11-28 DIAGNOSIS — M87.9 OSTEONECROSIS OF RIGHT HIP (HCC): Primary | ICD-10-CM

## 2018-11-28 NOTE — TELEPHONE ENCOUNTER
Patient called requesting home health service for PT and home health aide for assistance with bathing several times a week. [facility] was contacted. They faxed face-to-face form that was completed and faxed back to them with last office visit and demographics. Patient's daughter notified. She will look forward to phone call from home health to set up eval visit.

## 2018-11-30 ENCOUNTER — HOSPITAL ENCOUNTER (OUTPATIENT)
Dept: PET IMAGING | Facility: HOSPITAL | Age: 68
Discharge: HOME | End: 2018-11-30
Attending: INTERNAL MEDICINE | Admitting: INTERNAL MEDICINE

## 2018-11-30 DIAGNOSIS — C34.82 MALIGNANT NEOPLASM OF OVERLAPPING SITES OF LEFT LUNG (HCC): ICD-10-CM

## 2018-11-30 PROCEDURE — 78815 PET IMAGE W/CT SKULL-THIGH: CPT | Performed by: RADIOLOGY

## 2018-11-30 PROCEDURE — A9552 F18 FDG: HCPCS | Performed by: INTERNAL MEDICINE

## 2018-11-30 PROCEDURE — 0 FLUDEOXYGLUCOSE F18 SOLUTION: Performed by: INTERNAL MEDICINE

## 2018-11-30 PROCEDURE — 78815 PET IMAGE W/CT SKULL-THIGH: CPT

## 2018-11-30 RX ADMIN — FLUDEOXYGLUCOSE F18 1 DOSE: 300 INJECTION INTRAVENOUS at 15:46

## 2018-12-03 ENCOUNTER — OFFICE VISIT (OUTPATIENT)
Dept: ONCOLOGY | Facility: CLINIC | Age: 68
End: 2018-12-03

## 2018-12-03 VITALS
OXYGEN SATURATION: 90 % | TEMPERATURE: 98.3 F | DIASTOLIC BLOOD PRESSURE: 83 MMHG | HEART RATE: 102 BPM | SYSTOLIC BLOOD PRESSURE: 121 MMHG | RESPIRATION RATE: 16 BRPM

## 2018-12-03 DIAGNOSIS — C34.82 MALIGNANT NEOPLASM OF OVERLAPPING SITES OF LEFT LUNG (HCC): Primary | ICD-10-CM

## 2018-12-03 DIAGNOSIS — C79.51 METASTASIS TO BONE (HCC): ICD-10-CM

## 2018-12-03 PROCEDURE — 99214 OFFICE O/P EST MOD 30 MIN: CPT | Performed by: INTERNAL MEDICINE

## 2018-12-03 RX ORDER — MORPHINE SULFATE 15 MG/1
15 TABLET, FILM COATED, EXTENDED RELEASE ORAL 2 TIMES DAILY
Qty: 60 TABLET | Refills: 0 | Status: SHIPPED | OUTPATIENT
Start: 2018-12-03

## 2018-12-03 RX ORDER — OXYCODONE HYDROCHLORIDE 5 MG/1
5 TABLET ORAL EVERY 6 HOURS PRN
Qty: 120 TABLET | Refills: 0 | Status: SHIPPED | OUTPATIENT
Start: 2018-12-03
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
## 2018-12-07 LAB
BACTERIA SPEC AEROBE CULT: NORMAL
FUNGUS SPEC CULT: NORMAL
FUNGUS SPEC CULT: NORMAL
FUNGUS SPEC FUNGUS STN: NORMAL

## 2018-12-11 ENCOUNTER — DOCUMENTATION (OUTPATIENT)
Dept: ONCOLOGY | Facility: HOSPITAL | Age: 68
End: 2018-12-11

## 2018-12-11 ENCOUNTER — OFFICE VISIT (OUTPATIENT)
Dept: ONCOLOGY | Facility: CLINIC | Age: 68
End: 2018-12-11

## 2018-12-11 VITALS
HEART RATE: 104 BPM | OXYGEN SATURATION: 90 % | BODY MASS INDEX: 24.19 KG/M2 | RESPIRATION RATE: 18 BRPM | WEIGHT: 128 LBS | DIASTOLIC BLOOD PRESSURE: 76 MMHG | TEMPERATURE: 98.1 F | SYSTOLIC BLOOD PRESSURE: 115 MMHG

## 2018-12-11 DIAGNOSIS — C34.82 MALIGNANT NEOPLASM OF OVERLAPPING SITES OF LEFT LUNG (HCC): Primary | ICD-10-CM

## 2018-12-11 DIAGNOSIS — C79.51 METASTASIS TO BONE (HCC): ICD-10-CM

## 2018-12-11 PROCEDURE — 99214 OFFICE O/P EST MOD 30 MIN: CPT | Performed by: INTERNAL MEDICINE
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
## 2018-12-11 NOTE — PROGRESS NOTES
SS spoke with Dr. Nava who request for  to speak with patient and daughter Rosio regarding hospice referral.  Pt agreeable to hospice referral and request [facility].  SS contacted [facility] [phone] per Sep[facility] to arrange hospice services.  SS informed hospice that pt will be having palliative radiation treatments.  [facility] unable to accept referral due to patient needing palliative radiation treatments.      SS contacted pt's daughter Rosio [phone] to inform that [facility] unable to accept patient due to palliative radiation treatments.    SS contacted [facility] [phone] per Sandee to give referral.  SS faxed [phone] face sheet, and MD dictation note.  Pt to have open MRI in [place] tomorrow at 1300 for headaches.  Hospice requesting to know reason for MRI.  SS explained to hospice that MD and family wanted MRI due to patient having headaches.  Pt to be admitted to hospice services after MRI.      SS spoke with Emigdio hospice supervisor at length discussing hospice referral and patients needs.  Emigdio to contact daughter to follow up with time and date to admit patient to services.  SS will follow as needed.

## 2018-12-17 ENCOUNTER — DOCUMENTATION (OUTPATIENT)
Dept: ONCOLOGY | Facility: HOSPITAL | Age: 68
End: 2018-12-17

## 2018-12-19 LAB
ACID FAST STN SPEC: NEGATIVE
BACTERIA SPEC AEROBE CULT: NEGATIVE
SPECIMEN PREPARATION: NORMAL

## 2019-01-04 ENCOUNTER — DOCUMENTATION (OUTPATIENT)
Dept: ONCOLOGY | Facility: HOSPITAL | Age: 69
End: 2019-01-04

## 2019-01-04 DIAGNOSIS — C34.82 MALIGNANT NEOPLASM OF OVERLAPPING SITES OF LEFT LUNG (HCC): Primary | ICD-10-CM

## 2019-01-04 NOTE — PROGRESS NOTES
SS received in basket stating:    Patients daughter Rosio Tse called said patient was currently with [facility] of [facility] and they wanted to switch to the [facility] hospice in [place]. She said she just needed a referral sent the fax number she said for [facility] was [phone]    SS contacted [facility] [phone] per Emigdio to inform that pt will be transferring to [facility]. SS contacted pt's daughter Rosio [phone] who states that she and patient request for hospice services to be switched from [facility] to [facility].  [phone] per Sep[facility] to give new referral.  SS faxed [phone] face sheet, MD order, labs, and consultation notes.  SS will follow.

## (undated) DEVICE — 1860S HEALTH CARE RESPIRATOR N95 120EA/C: Brand: 3M™

## (undated) DEVICE — BRUSH CYTO ENDO CELLEBRITY 1X2MM 140CM

## (undated) DEVICE — MAJ-1414 SINGLE USE ADPATER BIOPSY VALV: Brand: SINGLE USE ADAPTOR BIOPSY VALVE

## (undated) DEVICE — SINGLE USE BIOPSY VALVE MAJ-210: Brand: SINGLE USE BIOPSY VALVE (STERILE)

## (undated) DEVICE — TUBING, SUCTION, 1/4" X 12', STRAIGHT: Brand: MEDLINE

## (undated) DEVICE — SYR SLPTP 30CC

## (undated) DEVICE — ADAPT SWVL FIBROPTIC BRONCH

## (undated) DEVICE — 3-WAY STOPCOCK: Brand: MAXGUARD

## (undated) DEVICE — Device: Brand: BALLOON

## (undated) DEVICE — SINGLE USE SUCTION VALVE MAJ-209: Brand: SINGLE USE SUCTION VALVE (STERILE)

## (undated) DEVICE — SYR SLP TP 10ML DISP

## (undated) DEVICE — SINGLE USE ASPIRATION NEEDLE: Brand: SINGLE USE ASPIRATION NEEDLE

## (undated) DEVICE — Device: Brand: MEDEX

## (undated) DEVICE — CUFF SCD HEMOFORCE SEQ CALF STD MD

## (undated) DEVICE — ST NDL BRONCH ASP VIZISHOT 2 FLX 19GA

## (undated) DEVICE — GLV SURG SENSICARE W/ALOE PF LF 7.5 STRL

## (undated) DEVICE — TRAP,MUCUS SPECIMEN,40CC: Brand: MEDLINE